# Patient Record
Sex: FEMALE | Employment: FULL TIME | URBAN - METROPOLITAN AREA
[De-identification: names, ages, dates, MRNs, and addresses within clinical notes are randomized per-mention and may not be internally consistent; named-entity substitution may affect disease eponyms.]

---

## 2021-09-17 ENCOUNTER — IMMUNIZATION (OUTPATIENT)
Dept: LAB | Facility: HOSPITAL | Age: 21
End: 2021-09-17
Attending: EMERGENCY MEDICINE
Payer: MEDICAID

## 2021-09-17 DIAGNOSIS — Z23 NEED FOR VACCINATION: Primary | ICD-10-CM

## 2021-09-17 PROCEDURE — 0001A SARSCOV2 VAC 30MCG/0.3ML IM: CPT

## 2021-10-28 ENCOUNTER — IMMUNIZATION (OUTPATIENT)
Dept: LAB | Facility: HOSPITAL | Age: 21
End: 2021-10-28
Attending: EMERGENCY MEDICINE
Payer: MEDICAID

## 2021-10-28 DIAGNOSIS — Z23 NEED FOR VACCINATION: Primary | ICD-10-CM

## 2021-10-28 PROCEDURE — 0002A SARSCOV2 VAC 30MCG/0.3ML IM: CPT

## 2021-11-29 ENCOUNTER — TELEPHONE (OUTPATIENT)
Dept: INTERNAL MEDICINE CLINIC | Facility: HOSPITAL | Age: 21
End: 2021-11-29

## 2021-11-29 ENCOUNTER — NURSE ONLY (OUTPATIENT)
Dept: LAB | Facility: HOSPITAL | Age: 21
End: 2021-11-29
Attending: PREVENTIVE MEDICINE

## 2021-11-29 DIAGNOSIS — Z20.822 SUSPECTED COVID-19 VIRUS INFECTION: Primary | ICD-10-CM

## 2021-11-29 DIAGNOSIS — Z20.822 SUSPECTED COVID-19 VIRUS INFECTION: ICD-10-CM

## 2021-11-29 NOTE — TELEPHONE ENCOUNTER
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 (e.g., during meal breaks):  Yes []   No [x]    If yes, who:   Do you share a workspace?  Yes []   No [x]       If yes, w Al[facility] 3-5 days after exposure.                                            COVID-19 testing ordered: [x] Rapid    [] [facility]    Date test is to be taken:    11/29/2021    []  Outside testing        [x] Manager notified    INSTRUCTIONS PROVIDED:    [x] Empl

## 2021-11-30 NOTE — TELEPHONE ENCOUNTER
Results and RTW guidelines:    COVID RESULT:    [x] Viewed by employee in [facility]. RTW plan and instructions as indicated on triage call. Manager notified. Estimated RTW date:   [] Discussed with employee   [] Unable to reach by phone.   Sent via [employer] bring a copy of result to RTW appointment      Notes:     RTW PLAN:    [] RTW 10 days with clearance from [address] [place] [address]- call for appt 1-2 days prior to RTW date OR when feeling well enough to RTW (see guidelines above)  [x] RTW immediately, continue to monitor for s

## 2021-12-02 ENCOUNTER — TELEPHONE (OUTPATIENT)
Dept: INTERNAL MEDICINE CLINIC | Facility: HOSPITAL | Age: 21
End: 2021-12-02

## 2021-12-02 NOTE — TELEPHONE ENCOUNTER
Department: Call center  [] [facility]  []TAZ   [x] [address] [facility]    Dept Manager/Supervisor/team or clinical lead: Jaida Villareal    Position:  [] MD     [] RN     [] Respiratory Therapist     [] PCT     [] PSR      [x]     HAVE YOU RECEIVED THE COVID-19 wearing a mask? (e.g., during meal breaks):  Yes []   No [x]    If yes, who:   Do you share a workspace? Yes []   No [x]       If yes, with whom? Do you have any family members sick at home?      [] Yes    [x] No   If yes, explain:   Frankey Muslim, a exposure.                                                  COVID-19 testing ordered: [x] Rapid    [] [facility]    Date test is to be taken:    12/2    []  Outside testing             Please talk with Pt after testing      [x] Manager notified    INSTRUCTIONS

## 2021-12-04 NOTE — TELEPHONE ENCOUNTER
Covid test not completed. LVM to call hotline with update. Rapid ordered 12/2 but had negative rapid 11/29 and sx progressed. Would recommend Alinity instead of rapid for retest.  Send [employer] message sent requesting to call hotline.

## 2021-12-06 ENCOUNTER — NURSE ONLY (OUTPATIENT)
Dept: LAB | Facility: HOSPITAL | Age: 21
End: 2021-12-06
Attending: PREVENTIVE MEDICINE
Payer: MEDICAID

## 2021-12-06 DIAGNOSIS — Z20.822 SUSPECTED COVID-19 VIRUS INFECTION: ICD-10-CM

## 2021-12-06 LAB — SARS-COV-2 RNA RESP QL NAA+PROBE: NOT DETECTED

## 2021-12-06 NOTE — TELEPHONE ENCOUNTER
Test not yet completed. LVM to call hotline. Has not read [employer] message sent 12/4. Email sent to manager.

## 2021-12-06 NOTE — TELEPHONE ENCOUNTER
Spoke with Abiodun Beltrán. She stated that she didn't have transportation to get tested & still has cough,other Sx are improving,   Afebrile, denies N/V/D.    Will complete Rapid today and if Negative, and she remains afebrile, and free of N/V/D, she may work to

## 2021-12-07 NOTE — TELEPHONE ENCOUNTER
Results and RTW guidelines:    COVID RESULT:    [] Viewed by employee in [facility]. RTW plan and instructions as indicated on triage call. Manager notified. Estimated RTW date:   [x] Discussed with employee   [] Unable to reach by phone.   Sent via [employer]

## 2022-01-11 ENCOUNTER — TELEPHONE (OUTPATIENT)
Dept: INTERNAL MEDICINE CLINIC | Facility: HOSPITAL | Age: 22
End: 2022-01-11

## 2022-01-11 DIAGNOSIS — Z20.822 SUSPECTED COVID-19 VIRUS INFECTION: Primary | ICD-10-CM

## 2022-01-11 NOTE — TELEPHONE ENCOUNTER
Department: Call Center                                 [] [facility]  []TAZ   [x] [address] [facility]    Dept Manager/Supervisor/team or clinical lead: Freddy Manriquez    Position:  [] MD     [] RN     [] Respiratory Therapist     [] PCT     [] PSR      [] [address]     [] MA [x] Ot What shift do you work? Days  When was the last shift you worked? 1/5/22  When are you next scheduled to work?  1/11/22    Did you have close contact with someone on your unit while not wearing a mask? (e.g., during meal breaks):  Yes []   No [x]    If test order  [x]  If COVID positive results, off work minimum of 5 days from positive test or onset of symptoms (day 0)    [x]      On day 5, if asymptomatic or mildly symptomatic (with improving symptoms) may return to work day 6  [x]      On day 5, if sym

## 2022-01-13 ENCOUNTER — NURSE ONLY (OUTPATIENT)
Dept: LAB | Facility: HOSPITAL | Age: 22
End: 2022-01-13
Attending: PREVENTIVE MEDICINE
Payer: MEDICAID

## 2022-01-13 DIAGNOSIS — Z20.822 SUSPECTED COVID-19 VIRUS INFECTION: ICD-10-CM

## 2022-01-13 LAB — SARS-COV-2 RNA RESP QL NAA+PROBE: NOT DETECTED

## 2022-01-13 NOTE — TELEPHONE ENCOUNTER
Results and RTW guidelines:    COVID RESULT:    [x] Viewed by employee in [facility]. RTW plan and instructions as indicated on triage call. Manager notified. Estimated RTW date: 1/13/22  [] Discussed with employee   [] Unable to reach by phone.   Sent vi

## 2022-05-10 ENCOUNTER — TELEPHONE (OUTPATIENT)
Dept: INTERNAL MEDICINE CLINIC | Facility: HOSPITAL | Age: 22
End: 2022-05-10

## 2022-05-11 ENCOUNTER — LAB ENCOUNTER (OUTPATIENT)
Dept: LAB | Age: 22
End: 2022-05-11
Attending: PREVENTIVE MEDICINE
Payer: MEDICAID

## 2022-05-11 DIAGNOSIS — Z20.822 SUSPECTED COVID-19 VIRUS INFECTION: ICD-10-CM

## 2022-05-11 LAB — SARS-COV-2 RNA RESP QL NAA+PROBE: DETECTED

## 2022-05-11 NOTE — TELEPHONE ENCOUNTER
Results and RTW guidelines:    COVID RESULT:    [x] Viewed by employee in [address] RTW plan and instructions as indicated on triage call. Manager notified. Estimated RTW date: 5/15/22  [x] Discussed with employee   [] Unable to reach by phone. Sent via [employer] message      Test type:    [x] Rapid         [] Alinity         [] Outside test:       [x] Positive  Is employee unvaccinated? Yes []   No [x]          If yes:  Enter Covid Positive Medical exemption on Exemption Spreadsheet    Did you have close contact with someone on your unit while not wearing a mask? (e.g., during meal breaks):  Yes []   No [x]     If yes, who:     - Employee should quarantine at home for at least 5 days (day 1 is day after sx onset) , follow the CDC guidelines for cleaning and                              quarantining; see CDC.gov   -This employee may RTW on day 6 if asymptomatic or mildly symptomatic (with improving symptoms). Call Employee Health on day 5 if unable to return on day 6 after                      symptom onset.    -This employee needs to call [employer] on day 5 after symptom onset. The employee needs to be cleared by [employer]. - Monitor symptoms and temperature                 - Notify PCP of result                 - Seek emergent care with worsening symptoms   - If employee is still experiencing severe symptoms on day 5 must make a RTW appt with [employer], Employee will not be cleared if:    1. Has consistent cough, shortness of breath or fatigue that restricts your physical activities    2. Is still feeling \"unwell\"    3. Within 15 days of hospitalization for COVID    4. Within 20 days of intubation for COVID    5.  Still has a fever, vomiting or diarrhea   - Keep communication open with management about RTW and if symptoms worsen                - If outside testing completed, bring a copy of result to RTW appointment           Notes:     RTW PLAN:    [x]  If COVID positive results, off work minimum of 5 days from positive test or onset of symptoms (day 0)        On day 5, if asymptomatic or mildly symptomatic (with improving symptoms) may return to work day 6          On day 5, if symptomatic, call Employee Health for RTW screening        []  COVID positive result - call Employee Health on day 5 after symptom onset. The employee needs to be cleared by Employee Health to RTW. [] RTW immediately, continue to monitor for sx  [] RTW when sx improve; must be fever free for 24 hours w/o medications, Diarrhea/Vomiting free for 24 hours w/o medications  [] Alinity ordered; continue to monitor sx and call for new/worsening sx.   Discuss RTW guidelines with manager  [] May continue to work  [] Follow up with PCP  [] Home until further instruction from hotline with [facility] results  INSTRUCTIONS PROVIDED:  [x]  Plan as noted above  []  Length of time to obtain results   [x]  Quarantine instructions  [x]  Masking protocol   [x]  S/S of worsening infection/condition and importance of prompt medical re-evaluation including when to seek emergency care  [] If symptoms develop, stay home and call hotline for rapid test order    Estimated RTW date:  5/15/22    [x] The employee voiced understanding of above plan/instructions  [x] Manager Notified

## 2022-11-17 ENCOUNTER — IMMUNIZATION (OUTPATIENT)
Dept: LAB | Facility: HOSPITAL | Age: 22
End: 2022-11-17
Attending: PREVENTIVE MEDICINE
Payer: MEDICAID

## 2022-11-17 DIAGNOSIS — Z23 NEED FOR VACCINATION: Primary | ICD-10-CM

## 2022-11-17 PROCEDURE — 90471 IMMUNIZATION ADMIN: CPT

## 2024-02-19 ENCOUNTER — OFFICE VISIT (OUTPATIENT)
Facility: CLINIC | Age: 24
End: 2024-02-19
Payer: COMMERCIAL

## 2024-02-19 ENCOUNTER — LAB ENCOUNTER (OUTPATIENT)
Dept: LAB | Facility: REFERENCE LAB | Age: 24
End: 2024-02-19
Attending: FAMILY MEDICINE
Payer: COMMERCIAL

## 2024-02-19 VITALS
OXYGEN SATURATION: 99 % | HEIGHT: 64.17 IN | DIASTOLIC BLOOD PRESSURE: 70 MMHG | BODY MASS INDEX: 44.92 KG/M2 | SYSTOLIC BLOOD PRESSURE: 120 MMHG | WEIGHT: 263.13 LBS | HEART RATE: 56 BPM

## 2024-02-19 DIAGNOSIS — Z00.01 ENCOUNTER FOR GENERAL ADULT MEDICAL EXAMINATION WITH ABNORMAL FINDINGS: ICD-10-CM

## 2024-02-19 DIAGNOSIS — N63.14 MASS OF LOWER INNER QUADRANT OF RIGHT BREAST: ICD-10-CM

## 2024-02-19 DIAGNOSIS — Z23 NEED FOR VACCINATION: ICD-10-CM

## 2024-02-19 DIAGNOSIS — N92.6 IRREGULAR PERIODS: ICD-10-CM

## 2024-02-19 DIAGNOSIS — H52.00 HYPERMETROPIA, UNSPECIFIED LATERALITY: ICD-10-CM

## 2024-02-19 DIAGNOSIS — Z00.01 ENCOUNTER FOR GENERAL ADULT MEDICAL EXAMINATION WITH ABNORMAL FINDINGS: Primary | ICD-10-CM

## 2024-02-19 DIAGNOSIS — H53.009 AMBLYOPIA, UNSPECIFIED LATERALITY: ICD-10-CM

## 2024-02-19 DIAGNOSIS — G56.23 CUBITAL TUNNEL SYNDROME OF BOTH UPPER EXTREMITIES: ICD-10-CM

## 2024-02-19 DIAGNOSIS — F32.2 SEVERE MAJOR DEPRESSION (HCC): ICD-10-CM

## 2024-02-19 DIAGNOSIS — Z11.3 SCREENING EXAMINATION FOR STD (SEXUALLY TRANSMITTED DISEASE): ICD-10-CM

## 2024-02-19 DIAGNOSIS — M77.8 TENDONITIS OF BOTH WRISTS: ICD-10-CM

## 2024-02-19 DIAGNOSIS — H52.31 ANISOMETROPIA: ICD-10-CM

## 2024-02-19 DIAGNOSIS — Z12.4 CERVICAL CANCER SCREENING: ICD-10-CM

## 2024-02-19 LAB
ALBUMIN SERPL-MCNC: 4.5 G/DL (ref 3.2–4.8)
ALBUMIN/GLOB SERPL: 1.7 {RATIO} (ref 1–2)
ALP LIVER SERPL-CCNC: 115 U/L
ALT SERPL-CCNC: 35 U/L
ANION GAP SERPL CALC-SCNC: 7 MMOL/L (ref 0–18)
AST SERPL-CCNC: 30 U/L (ref ?–34)
BASOPHILS # BLD AUTO: 0.04 X10(3) UL (ref 0–0.2)
BASOPHILS NFR BLD AUTO: 0.4 %
BILIRUB SERPL-MCNC: 0.6 MG/DL (ref 0.3–1.2)
BUN BLD-MCNC: 9 MG/DL (ref 9–23)
BUN/CREAT SERPL: 12.2 (ref 10–20)
CALCIUM BLD-MCNC: 9.3 MG/DL (ref 8.7–10.4)
CHLORIDE SERPL-SCNC: 108 MMOL/L (ref 98–112)
CHOLEST SERPL-MCNC: 170 MG/DL (ref ?–200)
CO2 SERPL-SCNC: 25 MMOL/L (ref 21–32)
CREAT BLD-MCNC: 0.74 MG/DL
DEPRECATED RDW RBC AUTO: 40.9 FL (ref 35.1–46.3)
EGFRCR SERPLBLD CKD-EPI 2021: 115 ML/MIN/1.73M2 (ref 60–?)
EOSINOPHIL # BLD AUTO: 0.11 X10(3) UL (ref 0–0.7)
EOSINOPHIL NFR BLD AUTO: 1.2 %
ERYTHROCYTE [DISTWIDTH] IN BLOOD BY AUTOMATED COUNT: 12.6 % (ref 11–15)
EST. AVERAGE GLUCOSE BLD GHB EST-MCNC: 105 MG/DL (ref 68–126)
FASTING PATIENT LIPID ANSWER: YES
FASTING STATUS PATIENT QL REPORTED: YES
GLOBULIN PLAS-MCNC: 2.6 G/DL (ref 2.8–4.4)
GLUCOSE BLD-MCNC: 94 MG/DL (ref 70–99)
HBA1C MFR BLD: 5.3 % (ref ?–5.7)
HBV SURFACE AB SER QL: NONREACTIVE
HBV SURFACE AB SERPL IA-ACNC: <3.1 MIU/ML
HBV SURFACE AG SER-ACNC: 0.34 [IU]/L
HBV SURFACE AG SERPL QL IA: NONREACTIVE
HCT VFR BLD AUTO: 41.5 %
HCV AB SERPL QL IA: NONREACTIVE
HDLC SERPL-MCNC: 40 MG/DL (ref 40–59)
HGB BLD-MCNC: 13.8 G/DL
IMM GRANULOCYTES # BLD AUTO: 0.04 X10(3) UL (ref 0–1)
IMM GRANULOCYTES NFR BLD: 0.4 %
LDLC SERPL CALC-MCNC: 107 MG/DL (ref ?–100)
LYMPHOCYTES # BLD AUTO: 2.29 X10(3) UL (ref 1–4)
LYMPHOCYTES NFR BLD AUTO: 24.9 %
MCH RBC QN AUTO: 29.7 PG (ref 26–34)
MCHC RBC AUTO-ENTMCNC: 33.3 G/DL (ref 31–37)
MCV RBC AUTO: 89.4 FL
MONOCYTES # BLD AUTO: 0.63 X10(3) UL (ref 0.1–1)
MONOCYTES NFR BLD AUTO: 6.9 %
NEUTROPHILS # BLD AUTO: 6.07 X10 (3) UL (ref 1.5–7.7)
NEUTROPHILS # BLD AUTO: 6.07 X10(3) UL (ref 1.5–7.7)
NEUTROPHILS NFR BLD AUTO: 66.2 %
NONHDLC SERPL-MCNC: 130 MG/DL (ref ?–130)
OSMOLALITY SERPL CALC.SUM OF ELEC: 288 MOSM/KG (ref 275–295)
PLATELET # BLD AUTO: 254 10(3)UL (ref 150–450)
POCT MAN UR PREGNANCY: NEGATIVE
POTASSIUM SERPL-SCNC: 4.2 MMOL/L (ref 3.5–5.1)
PROT SERPL-MCNC: 7.1 G/DL (ref 5.7–8.2)
RBC # BLD AUTO: 4.64 X10(6)UL
SODIUM SERPL-SCNC: 140 MMOL/L (ref 136–145)
T PALLIDUM AB SER QL IA: NONREACTIVE
TRIGL SERPL-MCNC: 130 MG/DL (ref 30–149)
TSI SER-ACNC: 2.05 MIU/ML (ref 0.55–4.78)
VLDLC SERPL CALC-MCNC: 22 MG/DL (ref 0–30)
WBC # BLD AUTO: 9.2 X10(3) UL (ref 4–11)

## 2024-02-19 PROCEDURE — 86780 TREPONEMA PALLIDUM: CPT

## 2024-02-19 PROCEDURE — 87340 HEPATITIS B SURFACE AG IA: CPT

## 2024-02-19 PROCEDURE — 84443 ASSAY THYROID STIM HORMONE: CPT

## 2024-02-19 PROCEDURE — 87491 CHLMYD TRACH DNA AMP PROBE: CPT | Performed by: FAMILY MEDICINE

## 2024-02-19 PROCEDURE — 36415 COLL VENOUS BLD VENIPUNCTURE: CPT

## 2024-02-19 PROCEDURE — 85025 COMPLETE CBC W/AUTO DIFF WBC: CPT

## 2024-02-19 PROCEDURE — 86706 HEP B SURFACE ANTIBODY: CPT

## 2024-02-19 PROCEDURE — 83036 HEMOGLOBIN GLYCOSYLATED A1C: CPT

## 2024-02-19 PROCEDURE — 80061 LIPID PANEL: CPT

## 2024-02-19 PROCEDURE — 80053 COMPREHEN METABOLIC PANEL: CPT

## 2024-02-19 PROCEDURE — 87389 HIV-1 AG W/HIV-1&-2 AB AG IA: CPT

## 2024-02-19 PROCEDURE — 87591 N.GONORRHOEAE DNA AMP PROB: CPT | Performed by: FAMILY MEDICINE

## 2024-02-19 PROCEDURE — 86803 HEPATITIS C AB TEST: CPT

## 2024-02-19 NOTE — PATIENT INSTRUCTIONS
For the numbness in your fingers, I think it is coming from cubital tunnel--> try buying a brace off of [employer] that does not let your elbow bend and sleep with it during flare ups

## 2024-02-19 NOTE — PROGRESS NOTES
Subjective:   Sera Butler is a 23 year old female who presents for Establish Care (Patient comes to the office as a new patient to [facility] care. ) and Physical (Patient is requesting an annual physical examination to be done today. )     Patient presents to [facility] care    Hand issue  Works in [employer]. 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 Has noticed over the past couple of months will notice painful wirst and hands. Will have to shake out hands. Will get paresthesias in 4th and 5th digits of both hands, Is right handed. Symptoms are worse in right hand     Lump on right breast  Has been present for the past few months. Does get these fairly frequently. Will usually self resolve. Will self aspirates it puss or clear fluid will come out. This lesion has lingered an not self resolved    Depression  Patient is noting feeling of depression and anxiety. Does think that uses alcohol to cope. Did quit 5 months ago for 3 months but stressors reoccurred and started drinking again. Drinks only on weekends but 10 drinks each day. Has tried therapy before but did not do well therapist. Is open to therapy.    Laast pap: never. Today is first pap. Patient's last menstrual period was 12/15/2023.       History/Other:    Chief Complaint Reviewed and Verified  Nursing Notes Reviewed and   Verified  Tobacco Reviewed  Allergies Reviewed  Medications Reviewed    Problem List Reviewed  Medical History Reviewed  Surgical History   Reviewed  Family History Reviewed  Social History Reviewed         Tobacco:  She has never smoked tobacco.    No current outpatient medications on file.         Review of Systems:  Review of Systems   Constitutional: Negative.    HENT: Negative.     Eyes: Negative.    Respiratory: Negative.     Cardiovascular: Negative.    Gastrointestinal: Negative.    Genitourinary: Negative.    Musculoskeletal:  Positive for arthralgias (wrist/hand pain bilateral).   Skin: Negative.         +lump in  right breast   Neurological: Negative.    Psychiatric/Behavioral:  Positive for dysphoric mood. The patient is nervous/anxious.        Objective:   /70 (BP Location: Left arm, Patient Position: Sitting, Cuff Size: adult)   Pulse 56   Ht 5' 4.17\" (1.63 m)   Wt 263 lb 2 oz (119.4 kg)   LMP 12/15/2023   SpO2 99%   BMI 44.92 kg/m²  Estimated body mass index is 44.92 kg/m² as calculated from the following:    Height as of this encounter: 5' 4.17\" (1.63 m).    Weight as of this encounter: 263 lb 2 oz (119.4 kg).  Physical Exam  Vitals and nursing note reviewed.   Constitutional:       General: She is not in acute distress.     Appearance: Normal appearance. She is not ill-appearing.   HENT:      Head: Normocephalic and atraumatic.      Right Ear: Tympanic membrane normal. There is no impacted cerumen.      Left Ear: Tympanic membrane normal. There is no impacted cerumen.      Mouth/Throat:      Mouth: Mucous membranes are moist.      Pharynx: Oropharynx is clear. No oropharyngeal exudate or posterior oropharyngeal erythema.   Eyes:      General:         Right eye: No discharge.         Left eye: No discharge.      Extraocular Movements: Extraocular movements intact.      Pupils: Pupils are equal, round, and reactive to light.   Cardiovascular:      Rate and Rhythm: Normal rate and regular rhythm.      Heart sounds: Normal heart sounds. No murmur heard.     No friction rub. No gallop.   Pulmonary:      Effort: Pulmonary effort is normal.      Breath sounds: Normal breath sounds. No wheezing, rhonchi or rales.   Abdominal:      General: Abdomen is flat. Bowel sounds are normal. There is no distension.      Palpations: Abdomen is soft. There is no mass.      Tenderness: There is no abdominal tenderness. There is no guarding or rebound.   Musculoskeletal:         General: Normal range of motion.      Cervical back: Normal range of motion.      Right lower leg: No edema.      Left lower leg: No edema.       Comments: Negative tinels and phalen's. No tenderness to palpation of wrists bilaterally and forearms   Skin:     General: Skin is warm and dry.      Findings: No rash.      Comments: +hyperpigmented lesion on right breast. No discharge. +induration. Nontender to palpation   Neurological:      General: No focal deficit present.      Mental Status: She is alert. Mental status is at baseline.   Psychiatric:         Mood and Affect: Mood normal.         Behavior: Behavior normal.       Offered patient chaperone for  exam. Patient declined Chaperone.    Assessment & Plan:   1. Encounter for general adult medical examination with abnormal findings (Primary)  -     CBC With Differential With Platelet; Future; Expected date: 02/19/2024  -     Lipid Panel; Future; Expected date: 02/19/2024  -     Comp Metabolic Panel (14); Future; Expected date: 02/19/2024  -     Hemoglobin A1C; Future; Expected date: 02/19/2024  -     TSH W Reflex To Free T4; Future; Expected date: 02/19/2024    -ordered annual labs    2. Tendonitis of both wrists, 3. Cubital tunnel syndrome of both upper extremities  -provided exercises to patient. Also showed examples of brace to trial. If no improvement in 4-6 weeks will refer to PT    4. Severe major depression (HCC)  -     Bo Leigh Navigator    -patient open to retrialing therapy. Placed referral. Will have close follow up    5. Screening examination for STD (sexually transmitted disease)  -     Chlamydia/Gc Amplification; Future; Expected date: 02/19/2024  -     HCV Antibody; Future; Expected date: 02/19/2024  -     Hepatitis B Surface Antibody; Future; Expected date: 02/19/2024  -     Hepatitis B Surface Antigen; Future; Expected date: 02/19/2024  -     HIV Ag/Ab Combo; Future; Expected date: 02/19/2024  -     T Pallidum Screening Cascade; Future; Expected date: 02/19/2024    -ordered    6. Need for vaccination  -     TETANUS, DIPHTHERIA TOXOIDS AND ACELLULAR PERTUSIS VACCINE (TDAP), >7 YEARS,  IM USE    -administered by staff    7. Cervical cancer screening  -     ThinPrep PAP with HPV Reflex Request B; Future; Expected date: 02/19/2024    -first pap collected    8. Irregular periods  -     POCT Pregnancy, Urine--> negative    9. Amblyopia, unspecified laterality, 10. Anisometropia,11. Hypermetropia, unspecified laterality  -     Ophthalmology Referral - In Network  -referred to ophtho        Return in about 2 weeks (around 3/4/2024) for Depression.    Luisa Kumari MD, 2/19/2024, 1:37 PM

## 2024-02-20 LAB
C TRACH DNA SPEC QL NAA+PROBE: NEGATIVE
N GONORRHOEA DNA SPEC QL NAA+PROBE: NEGATIVE

## 2024-02-22 ENCOUNTER — PATIENT MESSAGE (OUTPATIENT)
Facility: CLINIC | Age: 24
End: 2024-02-22

## 2024-02-22 NOTE — TELEPHONE ENCOUNTER
From: Sera Butler  To: Luisa Kumari  Sent: 2/22/2024 1:53 AM CST  Subject: Quick question    Hello Doctor,  I was looking through my info and noticed there is an upcoming GGT on my info, is this something I need to schedule?

## 2024-02-26 ENCOUNTER — TELEPHONE (OUTPATIENT)
Age: 24
End: 2024-02-26

## 2024-02-26 NOTE — TELEPHONE ENCOUNTER
Hello,     Thank you for speaking with me today. Below are the behavioral health providers I think might be a good fit and that are showing in network with your insurance. Please call the provider directly to schedule an appointment. If distance is a concern please inquire on virtual service options.     [facility]   Saige Guzman  [address]  [place] [address]  Phone: [phone]  [email or web address]     Comprehensive clinical services    Olga Chin   [address], [place], [place], [address]  Phone: [phone]  [email or web address]    Dr. Svitlana Dewitt  [facility]    [address],  [place] [address]  Phone:[phone]  [email or web address]    [facility]   Dr. Daksha Voss  [id number],   [place], [place], [address]  Phone: [phone]  [email or web address]    If any safety concerns arise it is advised to call 911, go to the nearest emergency room. [facility] crisis walk-in facility is located at [address] in [place]. Contact Number for [facility] is [phone].    Ekaterina Hill LCPC  (she/her/hers)  Patient Care Navigator Mental Health   [facility]ental [facility]    [email or web address]  [phone]  work

## 2024-03-11 ENCOUNTER — TELEMEDICINE (OUTPATIENT)
Facility: CLINIC | Age: 24
End: 2024-03-11
Payer: COMMERCIAL

## 2024-03-11 DIAGNOSIS — Z53.21 PATIENT LEFT WITHOUT BEING SEEN: Primary | ICD-10-CM

## (undated) DIAGNOSIS — Z20.822 SUSPECTED COVID-19 VIRUS INFECTION: Primary | ICD-10-CM